# Patient Record
Sex: FEMALE | Race: OTHER | HISPANIC OR LATINO
[De-identification: names, ages, dates, MRNs, and addresses within clinical notes are randomized per-mention and may not be internally consistent; named-entity substitution may affect disease eponyms.]

---

## 2018-03-15 ENCOUNTER — FORM ENCOUNTER (OUTPATIENT)
Age: 38
End: 2018-03-15

## 2018-03-18 ENCOUNTER — FORM ENCOUNTER (OUTPATIENT)
Age: 38
End: 2018-03-18

## 2018-03-25 ENCOUNTER — FORM ENCOUNTER (OUTPATIENT)
Age: 38
End: 2018-03-25

## 2018-09-19 ENCOUNTER — FORM ENCOUNTER (OUTPATIENT)
Age: 38
End: 2018-09-19

## 2018-09-30 ENCOUNTER — FORM ENCOUNTER (OUTPATIENT)
Age: 38
End: 2018-09-30

## 2019-07-25 ENCOUNTER — FORM ENCOUNTER (OUTPATIENT)
Age: 39
End: 2019-07-25

## 2019-09-24 ENCOUNTER — FORM ENCOUNTER (OUTPATIENT)
Age: 39
End: 2019-09-24

## 2021-03-23 PROBLEM — Z00.00 ENCOUNTER FOR PREVENTIVE HEALTH EXAMINATION: Status: ACTIVE | Noted: 2021-03-23

## 2021-06-16 PROBLEM — F17.200 CURRENT EVERY DAY SMOKER: Status: ACTIVE | Noted: 2021-06-16

## 2021-06-16 PROBLEM — Z78.9 NO PERTINENT PAST MEDICAL HISTORY: Status: RESOLVED | Noted: 2021-06-16 | Resolved: 2021-06-16

## 2021-06-17 ENCOUNTER — APPOINTMENT (OUTPATIENT)
Dept: BREAST CENTER | Facility: CLINIC | Age: 41
End: 2021-06-17
Payer: COMMERCIAL

## 2021-06-17 ENCOUNTER — NON-APPOINTMENT (OUTPATIENT)
Age: 41
End: 2021-06-17

## 2021-06-17 VITALS
DIASTOLIC BLOOD PRESSURE: 65 MMHG | HEART RATE: 64 BPM | SYSTOLIC BLOOD PRESSURE: 100 MMHG | WEIGHT: 101.38 LBS | BODY MASS INDEX: 19.14 KG/M2 | HEIGHT: 61 IN

## 2021-06-17 DIAGNOSIS — Z78.9 OTHER SPECIFIED HEALTH STATUS: ICD-10-CM

## 2021-06-17 DIAGNOSIS — F17.200 NICOTINE DEPENDENCE, UNSPECIFIED, UNCOMPLICATED: ICD-10-CM

## 2021-06-17 PROCEDURE — 99213 OFFICE O/P EST LOW 20 MIN: CPT

## 2021-06-17 PROCEDURE — 99072 ADDL SUPL MATRL&STAF TM PHE: CPT

## 2022-03-28 ENCOUNTER — APPOINTMENT (OUTPATIENT)
Dept: BREAST CENTER | Facility: CLINIC | Age: 42
End: 2022-03-28
Payer: COMMERCIAL

## 2022-03-28 VITALS
SYSTOLIC BLOOD PRESSURE: 116 MMHG | BODY MASS INDEX: 19.9 KG/M2 | DIASTOLIC BLOOD PRESSURE: 71 MMHG | WEIGHT: 105.38 LBS | HEART RATE: 54 BPM | HEIGHT: 61 IN

## 2022-03-28 DIAGNOSIS — Z78.9 OTHER SPECIFIED HEALTH STATUS: ICD-10-CM

## 2022-03-28 DIAGNOSIS — Z72.89 OTHER PROBLEMS RELATED TO LIFESTYLE: ICD-10-CM

## 2022-03-28 PROCEDURE — 99214 OFFICE O/P EST MOD 30 MIN: CPT

## 2023-04-28 ENCOUNTER — APPOINTMENT (OUTPATIENT)
Dept: BREAST CENTER | Facility: CLINIC | Age: 43
End: 2023-04-28
Payer: COMMERCIAL

## 2023-04-28 VITALS
DIASTOLIC BLOOD PRESSURE: 76 MMHG | WEIGHT: 110 LBS | SYSTOLIC BLOOD PRESSURE: 117 MMHG | HEART RATE: 55 BPM | HEIGHT: 61 IN | BODY MASS INDEX: 20.77 KG/M2

## 2023-04-28 DIAGNOSIS — Z80.3 FAMILY HISTORY OF MALIGNANT NEOPLASM OF BREAST: ICD-10-CM

## 2023-04-28 DIAGNOSIS — Z78.9 OTHER SPECIFIED HEALTH STATUS: ICD-10-CM

## 2023-04-28 PROCEDURE — 99213 OFFICE O/P EST LOW 20 MIN: CPT

## 2023-04-28 NOTE — PHYSICAL EXAM
[Normocephalic] : normocephalic [EOMI] : extra ocular movement intact [Supple] : supple [No Supraclavicular Adenopathy] : no supraclavicular adenopathy [No Cervical Adenopathy] : no cervical adenopathy [de-identified] : Bilateral breast/axilla/supraclavicular area: No masses, discharge, or adenopathy\par L chronic nipple inversion \par

## 2023-04-28 NOTE — HISTORY OF PRESENT ILLNESS
[FreeTextEntry1] : Patient is a 42y.o  female who presents today for breast cancer screening. She is followed for fhx of breast cancer in maternal cousin (age 50). Pt is BRCA negative with VUS CDH1(tested 2018). Pt with history of L chronic nipple inversion and 2 weeks of L spontaneous non bloody milky nipple discharge that has resolved. Patient denies palpable masses, skin changes, or nipple discharge bilaterally.\par \par SHANTHI Lifetime Risk- 17.8%\par \par 2/10/16: B/l MG & US- extremely dense, bilateral benign nodules on ultrasound\par 5/26/16: R US- normal\par 3/19/18: B/l MG & US- stable nodules bilaterally\par 10/118: MRI- no evidence of malignancy\par 7/26/19: B/l MG & US- stable benign nodules left breast. \par 3/25/21: B/l MG & US- ext dense, wnl; US- multiple b/l masses w/ benign features, BIRADS 2\par 3/28/22: B/l MG & US- Ext dense, US: R benign cyst and nodule, L multiple stable nodules, L 0.6cm new ovoid nodule at 4:00- rec 6 month f/u L targeted US- BI RADS 3 \par 6/27/22: MRI- No MR evidence of malignancy; no findings suspicious for malignancy. BI-RADS 2\par 4/28/23: B/l MG & US- extremely dense. US- B/l solid hypoechoic masses. L complicated cysts x2. BI-RADS 2

## 2023-04-28 NOTE — PAST MEDICAL HISTORY
[Menarche Age ____] : age at menarche was [unfilled] [Regular Cycle Intervals] : have been regular [Total Preg ___] : G[unfilled] [Live Births ___] : P[unfilled]  [Abortions ___] : Abortions:[unfilled] [Definite ___ (Date)] : the last menstrual period was [unfilled] [History of Hormone Replacement Treatment] : has no history of hormone replacement treatment [FreeTextEntry2] : Miscarriage 1 [FreeTextEntry6] : No [FreeTextEntry7] : no [FreeTextEntry8] : n/a

## 2024-04-25 PROBLEM — R92.8 ABNORMAL FINDING ON RADIOLOGICAL EXAMINATION OF BREAST: Status: ACTIVE | Noted: 2022-03-28

## 2024-04-25 PROBLEM — N64.9 DISORDER OF BREAST, UNSPECIFIED: Status: ACTIVE | Noted: 2021-06-16

## 2024-05-01 ENCOUNTER — APPOINTMENT (OUTPATIENT)
Dept: BREAST CENTER | Facility: CLINIC | Age: 44
End: 2024-05-01
Payer: COMMERCIAL

## 2024-05-01 VITALS
HEIGHT: 61 IN | SYSTOLIC BLOOD PRESSURE: 115 MMHG | BODY MASS INDEX: 20.77 KG/M2 | HEART RATE: 83 BPM | WEIGHT: 110 LBS | DIASTOLIC BLOOD PRESSURE: 66 MMHG

## 2024-05-01 DIAGNOSIS — R92.8 OTHER ABNORMAL AND INCONCLUSIVE FINDINGS ON DIAGNOSTIC IMAGING OF BREAST: ICD-10-CM

## 2024-05-01 DIAGNOSIS — N64.9 DISORDER OF BREAST, UNSPECIFIED: ICD-10-CM

## 2024-05-01 PROCEDURE — 99213 OFFICE O/P EST LOW 20 MIN: CPT

## 2024-05-01 NOTE — PHYSICAL EXAM
[Normocephalic] : normocephalic [EOMI] : extra ocular movement intact [Supple] : supple [No Supraclavicular Adenopathy] : no supraclavicular adenopathy [No Cervical Adenopathy] : no cervical adenopathy [de-identified] : Bilateral breast/axilla/supraclavicular area: No masses, discharge, or adenopathy\par  L chronic nipple inversion \par

## 2024-05-01 NOTE — HISTORY OF PRESENT ILLNESS
[FreeTextEntry1] : Patient is a 43y.o  female who presents today for breast cancer screening. She is followed for fhx of breast cancer in maternal cousin (age 50). Pt is BRCA negative with VUS CDH1(tested 2018). Pt with history of L chronic nipple inversion and 2 weeks of L spontaneous non bloody milky nipple discharge that has resolved. Patient denies palpable masses, skin changes, or nipple discharge bilaterally.  SHANTHI Lifetime Risk- 17.8%  2/10/16: B/l MG & US- extremely dense, bilateral benign nodules on ultrasound 5/26/16: R US- normal 3/19/18: B/l MG & US- stable nodules bilaterally 10/118: MRI- no evidence of malignancy 7/26/19: B/l MG & US- stable benign nodules left breast.  3/25/21: B/l MG & US- ext dense, wnl; US- multiple b/l masses w/ benign features, BIRADS 2 3/28/22: B/l MG & US- Ext dense, US: R benign cyst and nodule, L multiple stable nodules, L 0.6cm new ovoid nodule at 4:00- rec 6 month f/u L targeted US- BI RADS 3  6/27/22: MRI- No MR evidence of malignancy; no findings suspicious for malignancy. BI-RADS 2 4/28/23: B/l MG & US- extremely dense. US- B/l solid hypoechoic masses. L complicated cysts x2. BI-RADS 2 5/1/24: B/L MG & US- extremely dense. Benign findings. BI-RADS 2

## 2024-05-01 NOTE — PAST MEDICAL HISTORY
[Menarche Age ____] : age at menarche was [unfilled] [Definite ___ (Date)] : the last menstrual period was [unfilled] [Regular Cycle Intervals] : have been regular [Total Preg ___] : G[unfilled] [Live Births ___] : P[unfilled]  [Abortions ___] : Abortions:[unfilled] [History of Hormone Replacement Treatment] : has no history of hormone replacement treatment [FreeTextEntry2] : Miscarriage 1 [FreeTextEntry6] : No [FreeTextEntry7] : no [FreeTextEntry8] : n/a

## 2024-07-04 ENCOUNTER — NON-APPOINTMENT (OUTPATIENT)
Age: 44
End: 2024-07-04

## 2025-05-29 ENCOUNTER — APPOINTMENT (OUTPATIENT)
Dept: BREAST CENTER | Facility: CLINIC | Age: 45
End: 2025-05-29
Payer: COMMERCIAL

## 2025-05-29 ENCOUNTER — NON-APPOINTMENT (OUTPATIENT)
Age: 45
End: 2025-05-29

## 2025-05-29 DIAGNOSIS — N64.9 DISORDER OF BREAST, UNSPECIFIED: ICD-10-CM

## 2025-05-29 PROCEDURE — 99214 OFFICE O/P EST MOD 30 MIN: CPT
